# Patient Record
Sex: MALE | Race: WHITE | Employment: FULL TIME | URBAN - METROPOLITAN AREA
[De-identification: names, ages, dates, MRNs, and addresses within clinical notes are randomized per-mention and may not be internally consistent; named-entity substitution may affect disease eponyms.]

---

## 2017-01-06 ENCOUNTER — NURSE ONLY (OUTPATIENT)
Dept: HEMATOLOGY/ONCOLOGY | Facility: HOSPITAL | Age: 47
End: 2017-01-06

## 2017-01-16 ENCOUNTER — OFFICE VISIT (OUTPATIENT)
Dept: HEMATOLOGY/ONCOLOGY | Age: 47
End: 2017-01-16
Attending: INTERNAL MEDICINE
Payer: COMMERCIAL

## 2017-01-16 VITALS
SYSTOLIC BLOOD PRESSURE: 122 MMHG | RESPIRATION RATE: 18 BRPM | TEMPERATURE: 98 F | DIASTOLIC BLOOD PRESSURE: 87 MMHG | HEART RATE: 82 BPM

## 2017-01-16 DIAGNOSIS — D50.9 IRON DEFICIENCY ANEMIA: Primary | ICD-10-CM

## 2017-01-16 DIAGNOSIS — K90.9 MALABSORPTION: ICD-10-CM

## 2017-01-16 PROCEDURE — 96374 THER/PROPH/DIAG INJ IV PUSH: CPT

## 2017-01-16 NOTE — PATIENT INSTRUCTIONS
To reach Dr Junior Sorensen nurse during business hours, please call [phone]. After hours, including weekends, evenings, and holidays, please call the main number [phone] for emergent needs.       Iron Sucrose Solution for injection  What is this medic What if I miss a dose? It is important not to miss your dose. Call your doctor or health care professional if you are unable to keep an appointment. Where should I keep my medicine?   This drug is given in a hospital or clinic and will not be stored at [facility]

## 2017-01-16 NOTE — PROGRESS NOTES
Education Record    Learner:  Patient    Disease / Dee Salon    Barriers / Limitations:  None    Method:  Brief focused, printed material and  reinforcement    General Topics:  Plan of care reviewed    Outcome:  Shows understanding

## 2017-03-28 ENCOUNTER — OFFICE VISIT (OUTPATIENT)
Dept: HEMATOLOGY/ONCOLOGY | Age: 47
End: 2017-03-28
Attending: INTERNAL MEDICINE
Payer: COMMERCIAL

## 2017-03-28 VITALS
HEART RATE: 90 BPM | TEMPERATURE: 97 F | DIASTOLIC BLOOD PRESSURE: 86 MMHG | OXYGEN SATURATION: 98 % | RESPIRATION RATE: 18 BRPM | SYSTOLIC BLOOD PRESSURE: 122 MMHG | WEIGHT: 191.81 LBS

## 2017-03-28 DIAGNOSIS — D50.9 IRON DEFICIENCY ANEMIA, UNSPECIFIED IRON DEFICIENCY ANEMIA TYPE: ICD-10-CM

## 2017-03-28 DIAGNOSIS — K90.9 IMPAIRED INTESTINAL ABSORPTION: ICD-10-CM

## 2017-03-28 PROCEDURE — 99214 OFFICE O/P EST MOD 30 MIN: CPT | Performed by: INTERNAL MEDICINE

## 2017-03-28 RX ORDER — FLUTICASONE PROPIONATE 50 MCG
SPRAY, SUSPENSION (ML) NASAL DAILY
COMMUNITY

## 2017-03-28 NOTE — PROGRESS NOTES
Cancer Center Progress Note    Patient Name: Milan Camargo   YOB: 1970   Medical Record Number: [id number]   CSN: [id number]   Attending Physician: Lauren Arroyo M.D.      Date of Visit: 3/28/2017     Chief Complaint:  Patient presents wi MCG/ACT Nasal Suspension, by Each Nare route daily. , Disp: , Rfl:   •  Loratadine (CLARITIN) 10 MG Oral Cap, Take 10 mg by mouth daily as needed. , Disp: , Rfl:     Past Medical History:  Past Medical History   Diagnosis Date   • Iron deficiency anemia 2009 starting to get symptomatic again. Discussed his options. Never seemed to increment with oral iron despite forging through GI symptoms on these. Tolerated IV iron and w/o problems. Got Venofer before- just a 200 mg dose.  Explained that Infed is preferred d

## 2017-03-28 NOTE — PROGRESS NOTES
Pt here for 3 month MD f/u. Pt is more tired than normal. Appetite has been good. Denies pain. Pt had recent labs at [facility]. Pt has no further complaints.      Education Record    Learner:  Patient    Disease / Diagnosis:    Barriers / Limitations:  None   C

## 2017-05-01 ENCOUNTER — OFFICE VISIT (OUTPATIENT)
Dept: HEMATOLOGY/ONCOLOGY | Age: 47
End: 2017-05-01
Attending: INTERNAL MEDICINE
Payer: COMMERCIAL

## 2017-05-01 ENCOUNTER — APPOINTMENT (OUTPATIENT)
Dept: HEMATOLOGY/ONCOLOGY | Age: 47
End: 2017-05-01
Attending: INTERNAL MEDICINE
Payer: COMMERCIAL

## 2017-05-01 VITALS
DIASTOLIC BLOOD PRESSURE: 85 MMHG | TEMPERATURE: 98 F | OXYGEN SATURATION: 98 % | RESPIRATION RATE: 18 BRPM | SYSTOLIC BLOOD PRESSURE: 139 MMHG | WEIGHT: 196.88 LBS | HEART RATE: 94 BPM | BODY MASS INDEX: 27.56 KG/M2 | HEIGHT: 71 IN

## 2017-05-01 DIAGNOSIS — K90.89 OTHER SPECIFIED INTESTINAL MALABSORPTION: ICD-10-CM

## 2017-05-01 DIAGNOSIS — D50.8 OTHER IRON DEFICIENCY ANEMIA: Primary | ICD-10-CM

## 2017-05-01 PROCEDURE — 96365 THER/PROPH/DIAG IV INF INIT: CPT

## 2017-05-01 PROCEDURE — 96375 TX/PRO/DX INJ NEW DRUG ADDON: CPT

## 2017-05-01 NOTE — PROGRESS NOTES
Education Record    Learner:  Patient    Disease / Pattricia Specter    Barriers / Limitations:  None    Method:  Brief focused, printed material and  reinforcement    General Topics:  Plan of care reviewed    Outcome:  Shows understanding

## 2017-08-03 RX ORDER — DIPHENHYDRAMINE HCL 25 MG
25 CAPSULE ORAL ONCE
Status: CANCELLED | OUTPATIENT
Start: 2017-08-04 | End: 2017-08-04

## 2017-08-04 ENCOUNTER — OFFICE VISIT (OUTPATIENT)
Dept: HEMATOLOGY/ONCOLOGY | Age: 47
End: 2017-08-04
Attending: INTERNAL MEDICINE
Payer: COMMERCIAL

## 2017-08-04 ENCOUNTER — APPOINTMENT (OUTPATIENT)
Dept: HEMATOLOGY/ONCOLOGY | Age: 47
End: 2017-08-04
Attending: INTERNAL MEDICINE
Payer: COMMERCIAL

## 2017-08-04 VITALS
RESPIRATION RATE: 18 BRPM | OXYGEN SATURATION: 99 % | DIASTOLIC BLOOD PRESSURE: 85 MMHG | TEMPERATURE: 98 F | SYSTOLIC BLOOD PRESSURE: 123 MMHG | HEART RATE: 67 BPM

## 2017-08-04 DIAGNOSIS — K90.89 OTHER SPECIFIED INTESTINAL MALABSORPTION: ICD-10-CM

## 2017-08-04 DIAGNOSIS — D50.8 OTHER IRON DEFICIENCY ANEMIA: Primary | ICD-10-CM

## 2017-08-04 PROCEDURE — 96365 THER/PROPH/DIAG IV INF INIT: CPT

## 2017-08-04 RX ORDER — DIPHENHYDRAMINE HCL 25 MG
25 CAPSULE ORAL ONCE
Status: COMPLETED | OUTPATIENT
Start: 2017-08-04 | End: 2017-08-04

## 2017-08-04 RX ORDER — DIPHENHYDRAMINE HCL 25 MG
25 CAPSULE ORAL ONCE
Status: CANCELLED | OUTPATIENT
Start: 2017-08-04 | End: 2017-08-04

## 2017-08-04 RX ADMIN — DIPHENHYDRAMINE HCL 25 MG: 25 MG CAPSULE ORAL at 13:05:00

## 2017-08-04 NOTE — PROGRESS NOTES
Pt tolerated infed infusion w/o issues. VSS- see flow sheet. Departed stable.   Education Record    Learner:  Patient    Disease / Diagnosis:    Barriers / Limitations:  None   Comments:    Method:  Discussion   Comments:    General Topics:  Plan of care re

## 2017-11-03 ENCOUNTER — OFFICE VISIT (OUTPATIENT)
Dept: HEMATOLOGY/ONCOLOGY | Age: 47
End: 2017-11-03
Attending: INTERNAL MEDICINE
Payer: COMMERCIAL

## 2017-11-03 VITALS
RESPIRATION RATE: 18 BRPM | SYSTOLIC BLOOD PRESSURE: 132 MMHG | DIASTOLIC BLOOD PRESSURE: 90 MMHG | OXYGEN SATURATION: 99 % | HEART RATE: 93 BPM | TEMPERATURE: 98 F

## 2017-11-03 DIAGNOSIS — D50.8 OTHER IRON DEFICIENCY ANEMIA: Primary | ICD-10-CM

## 2017-11-03 DIAGNOSIS — K90.89 OTHER SPECIFIED INTESTINAL MALABSORPTION: ICD-10-CM

## 2017-11-03 PROCEDURE — 96365 THER/PROPH/DIAG IV INF INIT: CPT

## 2017-11-03 NOTE — PROGRESS NOTES
Here for [facility]. States took Claritin today so does not need benadryl. Reviewed that it is no longer given.     Education Record  Learner:  Patient  Disease / Diagnosis:   Iron-deficiency anemia  Barriers / Limitations:  None  Method:  Printed material and

## 2018-02-07 ENCOUNTER — TELEPHONE (OUTPATIENT)
Dept: HEMATOLOGY/ONCOLOGY | Facility: HOSPITAL | Age: 48
End: 2018-02-07

## 2018-02-09 ENCOUNTER — OFFICE VISIT (OUTPATIENT)
Dept: HEMATOLOGY/ONCOLOGY | Age: 48
End: 2018-02-09
Attending: INTERNAL MEDICINE
Payer: COMMERCIAL

## 2018-02-09 VITALS
OXYGEN SATURATION: 97 % | RESPIRATION RATE: 18 BRPM | TEMPERATURE: 97 F | SYSTOLIC BLOOD PRESSURE: 129 MMHG | WEIGHT: 199.31 LBS | HEART RATE: 74 BPM | BODY MASS INDEX: 28 KG/M2 | DIASTOLIC BLOOD PRESSURE: 85 MMHG

## 2018-02-09 DIAGNOSIS — K90.89 OTHER SPECIFIED INTESTINAL MALABSORPTION: ICD-10-CM

## 2018-02-09 DIAGNOSIS — D50.8 OTHER IRON DEFICIENCY ANEMIA: Primary | ICD-10-CM

## 2018-02-09 PROCEDURE — 96365 THER/PROPH/DIAG IV INF INIT: CPT

## 2018-02-09 RX ORDER — ALBUTEROL SULFATE 90 UG/1
AEROSOL, METERED RESPIRATORY (INHALATION) EVERY 6 HOURS PRN
COMMUNITY

## 2018-02-09 NOTE — PROGRESS NOTES
Pt has labs done at a facility in [place]. Given copy of CBC and ferritin orders. Results to be faxed to office.   Education Record    Learner:  Patient    Disease / Diagnosis: iron def    Barriers / Limitations:  None   Comments:    Method:  Discussion   Co

## 2018-02-09 NOTE — PROGRESS NOTES
Education Record    Learner:  Patient    Disease / Diagnosis: iron def anemia    Barriers / Limitations:  None   Comments:    Method:  Discussion   Comments:    General Topics:  Medication, Side effects and symptom management and Plan of care reviewed   Co

## 2018-04-12 ENCOUNTER — TELEPHONE (OUTPATIENT)
Dept: HEMATOLOGY/ONCOLOGY | Facility: HOSPITAL | Age: 48
End: 2018-04-12

## 2018-04-12 DIAGNOSIS — D50.8 OTHER IRON DEFICIENCY ANEMIA: Primary | ICD-10-CM

## (undated) NOTE — MR AVS SNAPSHOT
After Visit Summary   1/16/2017    Volodymyr Barraza    MRN: [id number]           Diagnoses this Visit     Iron deficiency anemia    -  Primary     Malabsorption           Allergies     No Known Allergies      Your Vital Signs Were     BP Pulse Temp · feeling achy  · headache  · irritation at site where injected  · nausea, vomiting  · stomach upset  · tiredness  What may interact with this medicine?   Do not take this medicine with any of the following medications:  · deferoxamine  · dimercaprol  · oth Appointment with Rosemarie Davidson at [facility] in [place] [phone])   [address]            MyChart     Visit MyChart  You can access your MyChart to more actively manage your health care and view more de

## (undated) NOTE — MR AVS SNAPSHOT
After Visit Summary   3/28/2017    Melinda Tans    MRN: [id number]           Diagnoses this Visit     Iron deficiency anemia, unspecified iron deficiency anemia type         Impaired intestinal absorption           Allergies     No Known Allerg

## (undated) NOTE — MR AVS SNAPSHOT
After Visit Summary   5/1/2017    Rudy Ann    MRN: [id number]           Diagnoses this Visit     Other iron deficiency anemia    -  Primary     Other specified intestinal malabsorption           Allergies     No Known Allergies      Your Vi